# Patient Record
Sex: MALE | Race: WHITE | NOT HISPANIC OR LATINO | URBAN - METROPOLITAN AREA
[De-identification: names, ages, dates, MRNs, and addresses within clinical notes are randomized per-mention and may not be internally consistent; named-entity substitution may affect disease eponyms.]

---

## 2021-02-11 ENCOUNTER — TELEPHONE (OUTPATIENT)
Dept: DERMATOLOGY | Facility: CLINIC | Age: 31
End: 2021-02-11

## 2021-02-11 ENCOUNTER — OFFICE VISIT (OUTPATIENT)
Dept: DERMATOLOGY | Facility: CLINIC | Age: 31
End: 2021-02-11
Payer: COMMERCIAL

## 2021-02-11 DIAGNOSIS — L71.9 ROSACEA: Primary | ICD-10-CM

## 2021-02-11 DIAGNOSIS — B36.0 TINEA VERSICOLOR DUE TO MALASSEZIA FURFUR: ICD-10-CM

## 2021-02-11 PROCEDURE — 99202 OFFICE O/P NEW SF 15 MIN: CPT | Mod: GC | Performed by: STUDENT IN AN ORGANIZED HEALTH CARE EDUCATION/TRAINING PROGRAM

## 2021-02-11 RX ORDER — KETOCONAZOLE 20 MG/ML
SHAMPOO TOPICAL DAILY PRN
Qty: 120 ML | Refills: 3 | Status: SHIPPED | OUTPATIENT
Start: 2021-02-11 | End: 2021-11-15

## 2021-02-11 RX ORDER — DOXYCYCLINE 100 MG/1
100 CAPSULE ORAL DAILY
Qty: 90 CAPSULE | Refills: 1 | Status: SHIPPED | OUTPATIENT
Start: 2021-02-11 | End: 2021-11-15

## 2021-02-11 ASSESSMENT — PAIN SCALES - GENERAL: PAINLEVEL: NO PAIN (0)

## 2021-02-11 NOTE — PROGRESS NOTES
[facility] Dermatology Note  Encounter Date: Feb 11, 2021  Office Visit     Dermatology Problem List:  1. Papulopustular rosacea  -Doxycycline 100 mg qDay  -MetroCream BID  2. Tinea versicolor  -Ketoconazole shampoo    ____________________________________________    Assessment & Plan:     1. Papulopustular rosacea- moderate, ddx includes acne, but favor rosacea due to telangiectasias on cheeks and apparent lack of comedones. Discussed treatment options. Given severity level, PO antibiotics would be appropriate. Discussed risks/benefits/side effects.  -Doxycycline 100 mg qDay  -MetroCream BID    2. Tinea versicolor, mild- not symptomatic at this time. Ketoconazole shampoo in the past has helped.  -Ketoconazole shampoo      Procedures Performed:   None    Follow-up: 3 month(s) virtually (telephone with photos), or earlier for new or changing lesions    Staff and Resident:     Darius Jerez MD  Internal Medicine-Dermatology, PGY-5    Staffed with Dr. Hanna  ____________________________________________    CC: Acne (Hood is here today for acne and possible rosacea )    HPI:  Mr. Hood Lehman is a(n) 31 year old male who presents today as a new patient for possible acne or rosacea.  Has been dealing with papulopustules on face for a long time. His father has rosacea.  Only uses Cetaphil noisturizer. No antibiotic treatments in the past.  No lesions on trunk except some pink lesions he think may be a fungus on the right flank/near axilla.  Keto shampoo in the past has helped.    Patient is otherwise feeling well, without additional concerns.    Labs:  NA    Physical Exam:  Vitals: There were no vitals taken for this visit.  SKIN: Focused examination of face and upper chest, upper back, and right and left flank was performed.  - small papulopustules on forehead and cheeks with telangiectasias mid cheeks; no apparent comedones  -pink finely scaly papules inferior to right axilla  - No  other lesions of concern on areas examined.     Medications:  Current Outpatient Medications   Medication     doxycycline monohydrate (MONODOX) 100 MG capsule     ketoconazole (NIZORAL) 2 % external shampoo     metroNIDAZOLE (METROCREAM) 0.75 % external cream     No current facility-administered medications for this visit.       Past Medical History:   There is no problem list on file for this patient.    History reviewed. No pertinent past medical history.    CC Referred Self, MD  No address on file on close of this encounter.

## 2021-02-11 NOTE — NURSING NOTE
Dermatology Rooming Note    Hood Lehman's goals for this visit include:   Chief Complaint   Patient presents with     Acne     Hood is here today for acne and possible rosacea      BUDDY Perez

## 2021-02-11 NOTE — PATIENT INSTRUCTIONS
Doxycycline 100 mg daily--take with food  MetroCream twice a day to face    Ketoconazole shampoo to body for tinea versicolor

## 2021-02-11 NOTE — LETTER
2/11/2021       RE: Hood Lehman  [address]  [place] [facility] [address]     Dear Colleague,    Thank you for referring your patient, Hood Lehman, to the [facility] DERMATOLOGY CLINIC [place] at [facility]. Please see a copy of my visit note below.    [facility] Dermatology Note  Encounter Date: Feb 11, 2021  Office Visit     Dermatology Problem List:  1. Papulopustular rosacea  -Doxycycline 100 mg qDay  -MetroCream BID  2. Tinea versicolor  -Ketoconazole shampoo    ____________________________________________    Assessment & Plan:     1. Papulopustular rosacea- moderate, ddx includes acne, but favor rosacea due to telangiectasias on cheeks and apparent lack of comedones. Discussed treatment options. Given severity level, PO antibiotics would be appropriate. Discussed risks/benefits/side effects.  -Doxycycline 100 mg qDay  -MetroCream BID    2. Tinea versicolor, mild- not symptomatic at this time. Ketoconazole shampoo in the past has helped.  -Ketoconazole shampoo      Procedures Performed:   None    Follow-up: 3 month(s) virtually (telephone with photos), or earlier for new or changing lesions    Staff and Resident:     Darius Jerez MD  Internal Medicine-Dermatology, PGY-5    Staffed with Dr. Hanna  ____________________________________________    CC: Acne (Hood is here today for acne and possible rosacea )    HPI:  Mr. Hood Lehman is a(n) 31 year old male who presents today as a new patient for possible acne or rosacea.  Has been dealing with papulopustules on face for a long time. His father has rosacea.  Only uses Cetaphil noisturizer. No antibiotic treatments in the past.  No lesions on trunk except some pink lesions he think may be a fungus on the right flank/near axilla.  Keto shampoo in the past has helped.    Patient is otherwise feeling well, without additional  concerns.    Labs:  NA    Physical Exam:  Vitals: There were no vitals taken for this visit.  SKIN: Focused examination of face and upper chest, upper back, and right and left flank was performed.  - small papulopustules on forehead and cheeks with telangiectasias mid cheeks; no apparent comedones  -pink finely scaly papules inferior to right axilla  - No other lesions of concern on areas examined.     Medications:  Current Outpatient Medications   Medication     doxycycline monohydrate (MONODOX) 100 MG capsule     ketoconazole (NIZORAL) 2 % external shampoo     metroNIDAZOLE (METROCREAM) 0.75 % external cream     No current facility-administered medications for this visit.       Past Medical History:   There is no problem list on file for this patient.    History reviewed. No pertinent past medical history.    CC Referred MD Talib  No address on file on close of this encounter.      I talked with and examined Hood Lehman and I agree with the assessment and the plan. FILIPPO Hanna MD.        Again, thank you for allowing me to participate in the care of your patient.      Sincerely,    Darius Jerez MD

## 2021-02-11 NOTE — TELEPHONE ENCOUNTER
[facility] Call Center    Phone Message    May a detailed message be left on voicemail: yes     Reason for Call: Medication Question or concern regarding medication   Prescription Clarification  Name of Medication: metroNIDAZOLE (METROCREAM) 0.75 % external cream, and ketoconazole (NIZORAL) 2 % external shampoo  Prescribing Provider: Dr. Hanna   Pharmacy: [facility] - [facility] [id number]   What on the order needs clarification? Needs clarification on Medications before she call fill, Please call back at [phone]  - Thank You          Action Taken: Message routed to:  [facility] Derm    Travel Screening: Not Applicable

## 2021-02-11 NOTE — LETTER
Date:March 22, 2021      Patient was self referred, no letter generated. Do not send.        [facility] Health Information

## 2021-03-17 NOTE — PROGRESS NOTES
I talked with and examined Hood Lehman and I agree with the assessment and the plan. FILIPPO Hanna MD.

## 2021-05-13 ENCOUNTER — VIRTUAL VISIT (OUTPATIENT)
Dept: DERMATOLOGY | Facility: CLINIC | Age: 31
End: 2021-05-13
Payer: COMMERCIAL

## 2021-05-13 DIAGNOSIS — L71.9 ROSACEA: ICD-10-CM

## 2021-05-13 PROCEDURE — 99213 OFFICE O/P EST LOW 20 MIN: CPT | Mod: TEL | Performed by: STUDENT IN AN ORGANIZED HEALTH CARE EDUCATION/TRAINING PROGRAM

## 2021-05-13 RX ORDER — DOXYCYCLINE 50 MG/1
50 CAPSULE ORAL DAILY
Qty: 30 CAPSULE | Refills: 2 | Status: SHIPPED | OUTPATIENT
Start: 2021-05-13 | End: 2021-08-16

## 2021-05-13 ASSESSMENT — PAIN SCALES - GENERAL: PAINLEVEL: NO PAIN (0)

## 2021-05-13 NOTE — LETTER
5/13/2021       RE: Hood Lehman  [address]  [place] [facility] [address]     Dear Colleague,    Thank you for referring your patient, Hood Lehman, to the [facility] DERMATOLOGY CLINIC [place] at [facility]. Please see a copy of my visit note below.    [facility] Dermatology Note  Encounter Date: May 13, 2021  Store-and-Forward and Telephone ( [phone] ). Location of teledermatologist: [facility] DERMATOLOGY CLINIC [place].  Start time: 10:28 am. End time: 10:32 am.    Dermatology Problem List:  1. Papulopustular rosacea  -Doxycycline 50 mg qDay  -MetroCream BID  Previous: doxy 100 mg daily (responded well).  2. Tinea versicolor  -Ketoconazole shampoo    ____________________________________________    Assessment & Plan:     1. Papulopustular and erythrotelangiectatic rosacea:  Papulopustular component improved with doxy and metrocream. ET rosacea not affected by his current regimen. Plan will be to decrease his doxy to 50 mg PO BID x 3 months and have him make sure to use metrocream twice daily. If patient continues to do well on this dose of doxy, then will plan to taper even further. Regarding the ET rosacea, discussed that this is often very difficult to treat. Potential treatment options in the future would include PDL for the red telangiectatic patches.   - doxycycline 50 mg PO BID  -  Metronidazole 0.75% cream twice daily    Follow-up: 3 month(s) or earlier for new or changing lesions    Staff and Resident:     Dr. Partida staffed this patient.    Kraig Espino MD, PhD  Med-Derm PGY-5    Staff Physician Comments:   I evaluated any available patient photographs with the resident/medical student and I edited the assessment and plan as documented in the note. I was present on the line and participated in the entire telephone call/video visit.    Binu Partida MD   of  Dermatology  Department of Dermatology  [facility]    ____________________________________________    CC: Acne (pt states he would like a follow up on acne, pt states his acne is doing really well)    HPI:  Mr. Hood Lehman is a(n) 31 year old male who presents today for follow-up  for papular rosacea. Patient seen in person on 2/11/21 and was started on doxycycline 100 mg PO BID and metronidazole 0.75% cream twice daily. He says that his papules improved significantly on the doxycycline and metrocream. He reports that some he would forget to take the doxy up to a week at a time and he would notice during that time recurrence of his papules. He says that he has only been using the metrocream once daily and wasn't aware that it was to be used twice daily. He dose note that the medications haven't improved his red cheeks at all. He's tolerating the doxy well. He would like to know what the plan going forward would be as he was told its not a good idea to stay on doxy long term.     Patient is otherwise feeling well, without additional skin concerns.    Labs Reviewed:  N/A    Physical Exam:  Vitals: There were no vitals taken for this visit.  SKIN: Teledermatology photos were reviewed; image quality and interpretability: acceptable. Image date: 5/13/2021.  - there are two healing papules on the forehead  - there are red telangiectatic patches on bilateral cheeks  - No other lesions of concern on areas examined.     Medications:  Current Outpatient Medications   Medication     doxycycline monohydrate (MONODOX) 100 MG capsule     ketoconazole (NIZORAL) 2 % external shampoo     metroNIDAZOLE (METROCREAM) 0.75 % external cream     No current facility-administered medications for this visit.       Past Medical/Surgical History:   There is no problem list on file for this patient.    History reviewed. No pertinent past medical history.    CC No referring provider defined for this  encounter. on close of this encounter.      Again, thank you for allowing me to participate in the care of your patient.      Sincerely,    Kraig Espino MD

## 2021-05-13 NOTE — PATIENT INSTRUCTIONS
For your papular rosacea:  - decrease doxycycline to 50 mg daily  - continue using metronidazole cream twice daily

## 2021-05-13 NOTE — NURSING NOTE
Chief Complaint   Patient presents with     Acne     pt states he would like a follow up on acne, pt states his acne is doing really well     Kathy Degroot MA

## 2021-05-13 NOTE — PROGRESS NOTES
[facility] Dermatology Note  Encounter Date: May 13, 2021  Store-and-Forward and Telephone ( [phone] ). Location of teledermatologist: [facility] DERMATOLOGY CLINIC [place].  Start time: 10:28 am. End time: 10:32 am.    Dermatology Problem List:  1. Papulopustular rosacea  -Doxycycline 50 mg qDay  -MetroCream BID  Previous: doxy 100 mg daily (responded well).  2. Tinea versicolor  -Ketoconazole shampoo    ____________________________________________    Assessment & Plan:     1. Papulopustular and erythrotelangiectatic rosacea:  Papulopustular component improved with doxy and metrocream. ET rosacea not affected by his current regimen. Plan will be to decrease his doxy to 50 mg PO BID x 3 months and have him make sure to use metrocream twice daily. If patient continues to do well on this dose of doxy, then will plan to taper even further. Regarding the ET rosacea, discussed that this is often very difficult to treat. Potential treatment options in the future would include PDL for the red telangiectatic patches.   - doxycycline 50 mg PO BID  -  Metronidazole 0.75% cream twice daily    Follow-up: 3 month(s) or earlier for new or changing lesions    Staff and Resident:     Dr. Partida staffed this patient.    Kraig Espino MD, PhD  Med-Derm PGY-5    Staff Physician Comments:   I evaluated any available patient photographs with the resident/medical student and I edited the assessment and plan as documented in the note. I was present on the line and participated in the entire telephone call/video visit.    Binu Partida MD   of Dermatology  Department of Dermatology  [facility] [employer] [facility]    ____________________________________________    CC: Acne (pt states he would like a follow up on acne, pt states his acne is doing really well)    HPI:  Mr. Hood Lehman is a(n) 31 year old male who presents today for follow-up  for papular rosacea.  Patient seen in person on 2/11/21 and was started on doxycycline 100 mg PO BID and metronidazole 0.75% cream twice daily. He says that his papules improved significantly on the doxycycline and metrocream. He reports that some he would forget to take the doxy up to a week at a time and he would notice during that time recurrence of his papules. He says that he has only been using the metrocream once daily and wasn't aware that it was to be used twice daily. He dose note that the medications haven't improved his red cheeks at all. He's tolerating the doxy well. He would like to know what the plan going forward would be as he was told its not a good idea to stay on doxy long term.     Patient is otherwise feeling well, without additional skin concerns.    Labs Reviewed:  N/A    Physical Exam:  Vitals: There were no vitals taken for this visit.  SKIN: Teledermatology photos were reviewed; image quality and interpretability: acceptable. Image date: 5/13/2021.  - there are two healing papules on the forehead  - there are red telangiectatic patches on bilateral cheeks  - No other lesions of concern on areas examined.     Medications:  Current Outpatient Medications   Medication     doxycycline monohydrate (MONODOX) 100 MG capsule     ketoconazole (NIZORAL) 2 % external shampoo     metroNIDAZOLE (METROCREAM) 0.75 % external cream     No current facility-administered medications for this visit.       Past Medical/Surgical History:   There is no problem list on file for this patient.    History reviewed. No pertinent past medical history.    CC No referring provider defined for this encounter. on close of this encounter.

## 2021-05-13 NOTE — LETTER
Date:June 25, 2021      Patient was self referred, no letter generated. Do not send.         Health Information

## 2021-06-20 ENCOUNTER — HEALTH MAINTENANCE LETTER (OUTPATIENT)
Age: 31
End: 2021-06-20

## 2021-08-16 ENCOUNTER — VIRTUAL VISIT (OUTPATIENT)
Dept: DERMATOLOGY | Facility: CLINIC | Age: 31
End: 2021-08-16
Payer: COMMERCIAL

## 2021-08-16 DIAGNOSIS — L71.9 ROSACEA: ICD-10-CM

## 2021-08-16 PROCEDURE — 99213 OFFICE O/P EST LOW 20 MIN: CPT | Mod: 95 | Performed by: PHYSICIAN ASSISTANT

## 2021-08-16 RX ORDER — DOXYCYCLINE 50 MG/1
50 CAPSULE ORAL DAILY
Qty: 30 CAPSULE | Refills: 2 | Status: SHIPPED | OUTPATIENT
Start: 2021-08-16 | End: 2021-11-15

## 2021-08-16 ASSESSMENT — PAIN SCALES - GENERAL: PAINLEVEL: NO PAIN (0)

## 2021-08-16 NOTE — PROGRESS NOTES
[facility] Dermatology Note  Encounter Date: Aug 16, 2021  Store-and-Forward and Telephone ([phone]). Location of teledermatologist: [facility] DERMATOLOGY CLINIC [place].  Start time: 09:30 am End time:09:44 am    Dermatology Problem List:  1. Papulopustular rosacea  -Current rx: Doxycycline 50 mg qDay, MetroCream BID  Previous rx: doxy 100 mg daily (responded well).  2. Tinea versicolor  -Current rx: Ketoconazole shampoo       ____________________________________________    Assessment & Plan:   # Papulopustular and erythrotelangiectatic rosacea:  Papulopustular component improved with doxy.  Patient would like to avoid long-term use of doxycycline.  Will encourage  Metronidazole 0.75% cream twice daily use.  This was refilled.  I did refill his doxycycline in case he does have a flare.    Discussed potential triggers of rosacea such as spicy foods, extremes in temperatures, stress, alcohol or coffee.    -Briefly discussed other options such as Accutane, other topical treatments including Soolantra or sulfur-based medications.    Procedures Performed:    None    Follow-up: 3 month(s) virtually (telephone with photos), or earlier for new or changing lesions    Staff and Scribe:     Scribe Disclosure:  Petar ANTUNEZ, am serving as a scribe to document services personally performed by Britney James PA-C based on data collection and the provider's statements to me.   Provider Disclosure:   The documentation recorded by the scribe accurately reflects the services I personally performed and the decisions made by me.    All risks, benefits and alternatives were discussed with patient.  Patient is in agreement and understands the assessment and plan.  All questions were answered.  Sun Screen Education was given.   Return to Clinic in 3 months or sooner as needed.   Britney James PA-C   [facility]    ____________________________________________    CC: Acne (pt states he would like a 6 month follow up on acne, pt states his skin is doing much better.)    HPI:  Mr. Hood Lehman is a(n) 31 year old male who presents today as a return patient for follow-up. The patient was last seen in dermatology on 05/13/2021 by Dr. Espino at which point his doxycycline was decreased to 50 mg daily.   He was encouraged to continue metronidazole 0.75% cream twice daily.  I spoke with Hood and reviewed his photos.  He states that his acne is well controlled while taking doxycycline 50 mg daily.  He does have some mild side effects from this including sun sensitivity and he has more looser bowel movements but without any significant belly pain and no diarrhea.  No bloody stools.    He would like to not take antibiotics long-term.  He states he is inconsistent with using metronidazole cream.  He is unsure if the cream alone will be helpful but has never tried alone.  He does note that when he does not take the antibiotics for 1 to 2 days he has breakout with pimples again.    Patient is otherwise feeling well, without additional skin concerns.    Labs Reviewed:  NA    Physical Exam:  Vitals: There were no vitals taken for this visit.  SKIN: Teledermatology photos were reviewed; image quality and interpretability: acceptable. Image date: 08/13/2021  -There are patches of telangiectasis on bilateral cheeks.  No papules or pustules noted today.  - No other lesions of concern on areas examined.     Medications:  Current Outpatient Medications   Medication     doxycycline monohydrate (MONODOX) 100 MG capsule     ketoconazole (NIZORAL) 2 % external shampoo     metroNIDAZOLE (METROCREAM) 0.75 % external cream     No current facility-administered medications for this visit.      Past Medical/Surgical History:   There is no problem list on file for this patient.    No past medical history on file.    CC Referred MD Talib  No address on  file on close of this encounter.

## 2021-08-16 NOTE — LETTER
8/16/2021       RE: Hood Lehman  [address]  [place] [address]     Dear Colleague,    Thank you for referring your patient, Hood Lehman, to the [facility] DERMATOLOGY CLINIC [place] at [facility]. Please see a copy of my visit note below.    [facility] Dermatology Note  Encounter Date: Aug 16, 2021  Store-and-Forward and Telephone ([phone]). Location of teledermatologist: [facility]ERMATOLOGY [facility] [place].  Start time: 09:30 am End time:09:44 am    Dermatology Problem List:  1. Papulopustular rosacea  -Current rx: Doxycycline 50 mg qDay, MetroCream BID  Previous rx: doxy 100 mg daily (responded well).  2. Tinea versicolor  -Current rx: Ketoconazole shampoo       ____________________________________________    Assessment & Plan:   # Papulopustular and erythrotelangiectatic rosacea:  Papulopustular component improved with doxy.  Patient would like to avoid long-term use of doxycycline.  Will encourage  Metronidazole 0.75% cream twice daily use.  This was refilled.  I did refill his doxycycline in case he does have a flare.    Discussed potential triggers of rosacea such as spicy foods, extremes in temperatures, stress, alcohol or coffee.    -Briefly discussed other options such as Accutane, other topical treatments including Soolantra or sulfur-based medications.    Procedures Performed:    None    Follow-up: 3 month(s) virtually (telephone with photos), or earlier for new or changing lesions    Staff and Scribe:     Scribe Disclosure:  I, Petar Zepeda, am serving as a scribe to document services personally performed by Britney James PA-C based on data collection and the provider's statements to me.   Provider Disclosure:   The documentation recorded by the scribe accurately reflects the services I personally performed and the decisions made by me.    All risks, benefits and  alternatives were discussed with patient.  Patient is in agreement and understands the assessment and plan.  All questions were answered.  Sun Screen Education was given.   Return to Clinic in 3 months or sooner as needed.   Britney James PA-C   [facility]   ____________________________________________    CC: Acne (pt states he would like a 6 month follow up on acne, pt states his skin is doing much better.)    HPI:  Mr. Hood Lehman is a(n) 31 year old male who presents today as a return patient for follow-up. The patient was last seen in dermatology on 05/13/2021 by Dr. Espino at which point his doxycycline was decreased to 50 mg daily.   He was encouraged to continue metronidazole 0.75% cream twice daily.  I spoke with Hood and reviewed his photos.  He states that his acne is well controlled while taking doxycycline 50 mg daily.  He does have some mild side effects from this including sun sensitivity and he has more looser bowel movements but without any significant belly pain and no diarrhea.  No bloody stools.    He would like to not take antibiotics long-term.  He states he is inconsistent with using metronidazole cream.  He is unsure if the cream alone will be helpful but has never tried alone.  He does note that when he does not take the antibiotics for 1 to 2 days he has breakout with pimples again.    Patient is otherwise feeling well, without additional skin concerns.    Labs Reviewed:  NA    Physical Exam:  Vitals: There were no vitals taken for this visit.  SKIN: Teledermatology photos were reviewed; image quality and interpretability: acceptable. Image date: 08/13/2021  -There are patches of telangiectasis on bilateral cheeks.  No papules or pustules noted today.  - No other lesions of concern on areas examined.     Medications:  Current Outpatient Medications   Medication     doxycycline monohydrate (MONODOX) 100 MG capsule     ketoconazole (NIZORAL) 2 %  external shampoo     metroNIDAZOLE (METROCREAM) 0.75 % external cream     No current facility-administered medications for this visit.      Past Medical/Surgical History:   There is no problem list on file for this patient.    No past medical history on file.    CC Referred Self, MD  No address on file on close of this encounter.                                Again, thank you for allowing me to participate in the care of your patient.      Sincerely,    Britney James PA-C

## 2021-08-16 NOTE — LETTER
Date:August 18, 2021      Patient was self referred, no letter generated. Do not send.        [facility] Health Information

## 2021-10-11 ENCOUNTER — HEALTH MAINTENANCE LETTER (OUTPATIENT)
Age: 31
End: 2021-10-11

## 2021-11-15 ENCOUNTER — MYC MEDICAL ADVICE (OUTPATIENT)
Dept: DERMATOLOGY | Facility: CLINIC | Age: 31
End: 2021-11-15

## 2021-11-15 ENCOUNTER — VIRTUAL VISIT (OUTPATIENT)
Dept: DERMATOLOGY | Facility: CLINIC | Age: 31
End: 2021-11-15
Payer: COMMERCIAL

## 2021-11-15 DIAGNOSIS — L71.9 ROSACEA: ICD-10-CM

## 2021-11-15 DIAGNOSIS — L71.9 ACNE ROSACEA: Primary | ICD-10-CM

## 2021-11-15 PROCEDURE — 99214 OFFICE O/P EST MOD 30 MIN: CPT | Mod: GQ | Performed by: PHYSICIAN ASSISTANT

## 2021-11-15 RX ORDER — DOXYCYCLINE 50 MG/1
50 CAPSULE ORAL DAILY
Qty: 90 CAPSULE | Refills: 3 | Status: SHIPPED | OUTPATIENT
Start: 2021-11-15

## 2021-11-15 RX ORDER — IVERMECTIN 10 MG/G
CREAM TOPICAL
Qty: 45 G | Refills: 11 | Status: SHIPPED | OUTPATIENT
Start: 2021-11-15

## 2021-11-15 ASSESSMENT — PAIN SCALES - GENERAL: PAINLEVEL: NO PAIN (0)

## 2021-11-15 NOTE — NURSING NOTE
Dermatology Rooming Note    Hood Lehman's goals for this visit include:   Chief Complaint   Patient presents with     Derm Problem     acne follow up     Yaritza Nielson CMA on 11/15/2021 at 8:36 AM

## 2021-11-15 NOTE — LETTER
Date:November 18, 2021      Patient was self referred, no letter generated. Do not send.        [facility] Health Information

## 2021-11-15 NOTE — PROGRESS NOTES
[facility] Dermatology Note  Encounter Date: Nov 15, 2021  Store-and-Forward and Telephone ([phone]). Location of teledermatologist: [facility] [place].  Start time: 08:52. End time: 09:00am    Dermatology Problem List:  1. Papulopustular rosacea  -Current rx: Doxycycline 50 mg qDay,   Previous rx: doxy 100 mg daily (responded well). s/pMetroCream BID (not effective)  2. Tinea versicolor  -Current rx: Ketoconazole shampoo    ____________________________________________    Assessment & Plan:     # Papulopustular and erythrotelangiectatic rosacea: Discussed this is a chronic skin condition with flares.   Papulopustular component improved with doxy.  Patient would like to avoid long-term use of doxycycline, will attempt to switch to Soolantra but recommend continuing doxycycline 50 mg daily prior to trying to taper off of the medication.  -Continue doxycyline 50 mg daily. (reviewed side effects, tolerating well)  -Start ivermectin 1% cream daily.        -Future other treatment options: Sulfur based medications or Accutane.     Procedures Performed:    None    Follow-up: 3 month(s) virtually (telephone with photos), or earlier for new or changing lesions    Staff and Scribe:     Scribe Disclosure:  I, Calli Madrid, am serving as a scribe to document services personally performed by Britney James PA-C based on data collection and the provider's statements to me.     Provider Disclosure:   The documentation recorded by the scribe accurately reflects the services I personally performed and the decisions made by me.    All risks, benefits and alternatives were discussed with patient.  Patient is in agreement and understands the assessment and plan.  All questions were answered.  Sun Screen Education was given.   Return to Clinic in 3 months or sooner as needed.   Britney James PA-C   [facility]    ____________________________________________    CC: Derm Problem (acne follow up)    HPI:  Mr. Hood Lehman is a(n) 31 year old male who presents today as a return patient for acne rosacea.     The patient was last seen in dermatology virtually by myself on 8/16/21 at which point he was continued on metronidazole and 0.75% cream BID and doxy for flares for treatment of papulopustular rosacea. He has been taking doxycyline 50 mg daily ever since his appointment. If he skips a day or two, he has breakouts.  He sent photos of when his skin flared after missing a day or 2 of the doxycycline and had 2 acne pimples on his face.    He wound up stopping the cream since his last visit.  He felt this was not helpful and only made his skin more sensitive.    He is tolerating the doxycycline and denies any side effects such as nausea, vomiting or diarrhea.    Patient is otherwise feeling well, without additional skin concerns.    Labs Reviewed:  N/A    Physical Exam:  Vitals: There were no vitals taken for this visit.  SKIN: Teledermatology photos were reviewed; image quality and interpretability: acceptable. Image date: 11/15/21  - There is erythema noted to the central face.  -There are pink papules on the right cheek and right upper forehead.  - No other lesions of concern on areas examined.     Medications:  Current Outpatient Medications   Medication     doxycycline monohydrate (MONODOX) 100 MG capsule     doxycycline monohydrate (MONODOX) 50 MG capsule     ketoconazole (NIZORAL) 2 % external shampoo     metroNIDAZOLE (METROCREAM) 0.75 % external cream     No current facility-administered medications for this visit.      Past Medical/Surgical History:   There is no problem list on file for this patient.    No past medical history on file.    CC Referred Self, MD  No address on file on close of this encounter.

## 2021-11-15 NOTE — LETTER
11/15/2021       RE: Hood Lehman  [address]  [place] [address]     Dear Colleague,    Thank you for referring your patient, Hood Lehman, to the [facility] DERMATOLOG[facility] [place] at [facility]. Please see a copy of my visit note below.    [facility] Dermatology Note  Encounter Date: Nov 15, 2021  Store-and-Forward and Telephone ([phone]). Location of teledermatologist: [facility] DERMATOLOGY [facility].  Start time: 08:52. End time: 09:00am    Dermatology Problem List:  1. Papulopustular rosacea  -Current rx: Doxycycline 50 mg qDay,   Previous rx: doxy 100 mg daily (responded well). s/pMetroCream BID (not effective)  2. Tinea versicolor  -Current rx: Ketoconazole shampoo    ____________________________________________    Assessment & Plan:     # Papulopustular and erythrotelangiectatic rosacea: Discussed this is a chronic skin condition with flares.   Papulopustular component improved with doxy.  Patient would like to avoid long-term use of doxycycline, will attempt to switch to Soolantra but recommend continuing doxycycline 50 mg daily prior to trying to taper off of the medication.  -Continue doxycyline 50 mg daily. (reviewed side effects, tolerating well)  -Start ivermectin 1% cream daily.        -Future other treatment options: Sulfur based medications or Accutane.     Procedures Performed:    None    Follow-up: 3 month(s) virtually (telephone with photos), or earlier for new or changing lesions    Staff and Scribe:     Scribe Disclosure:  I, Calli Madrid, am serving as a scribe to document services personally performed by Britney James PA-C based on data collection and the provider's statements to me.     Provider Disclosure:   The documentation recorded by the scribe accurately reflects the services I personally performed and the decisions made by me.    All risks, benefits and  alternatives were discussed with patient.  Patient is in agreement and understands the assessment and plan.  All questions were answered.  Sun Screen Education was given.   Return to Clinic in 3 months or sooner as needed.   Britney James PA-C   [facility]   ____________________________________________    CC: Derm Problem (acne follow up)    HPI:  Mr. Hood Lehman is a(n) 31 year old male who presents today as a return patient for acne rosacea.     The patient was last seen in dermatology virtually by myself on 8/16/21 at which point he was continued on metronidazole and 0.75% cream BID and doxy for flares for treatment of papulopustular rosacea. He has been taking doxycyline 50 mg daily ever since his appointment. If he skips a day or two, he has breakouts.  He sent photos of when his skin flared after missing a day or 2 of the doxycycline and had 2 acne pimples on his face.    He wound up stopping the cream since his last visit.  He felt this was not helpful and only made his skin more sensitive.    He is tolerating the doxycycline and denies any side effects such as nausea, vomiting or diarrhea.    Patient is otherwise feeling well, without additional skin concerns.    Labs Reviewed:  N/A    Physical Exam:  Vitals: There were no vitals taken for this visit.  SKIN: Teledermatology photos were reviewed; image quality and interpretability: acceptable. Image date: 11/15/21  - There is erythema noted to the central face.  -There are pink papules on the right cheek and right upper forehead.  - No other lesions of concern on areas examined.     Medications:  Current Outpatient Medications   Medication     doxycycline monohydrate (MONODOX) 100 MG capsule     doxycycline monohydrate (MONODOX) 50 MG capsule     ketoconazole (NIZORAL) 2 % external shampoo     metroNIDAZOLE (METROCREAM) 0.75 % external cream     No current facility-administered medications for this visit.      Past  Medical/Surgical History:   There is no problem list on file for this patient.    No past medical history on file.    CC Referred Self, MD  No address on file on close of this encounter.                                Again, thank you for allowing me to participate in the care of your patient.      Sincerely,    Britney James PA-C

## 2022-07-17 ENCOUNTER — HEALTH MAINTENANCE LETTER (OUTPATIENT)
Age: 32
End: 2022-07-17

## 2022-09-24 ENCOUNTER — HEALTH MAINTENANCE LETTER (OUTPATIENT)
Age: 32
End: 2022-09-24

## 2023-01-30 ENCOUNTER — HEALTH MAINTENANCE LETTER (OUTPATIENT)
Age: 33
End: 2023-01-30

## 2024-03-02 ENCOUNTER — HEALTH MAINTENANCE LETTER (OUTPATIENT)
Age: 34
End: 2024-03-02